# Patient Record
Sex: MALE | Race: WHITE | NOT HISPANIC OR LATINO | Employment: FULL TIME | URBAN - METROPOLITAN AREA
[De-identification: names, ages, dates, MRNs, and addresses within clinical notes are randomized per-mention and may not be internally consistent; named-entity substitution may affect disease eponyms.]

---
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
## 2021-03-30 ENCOUNTER — OFFICE VISIT (OUTPATIENT)
Dept: SURGERY | Facility: CLINIC | Age: 21
End: 2021-03-30
Attending: SURGERY
Payer: COMMERCIAL

## 2021-03-30 VITALS
HEART RATE: 76 BPM | SYSTOLIC BLOOD PRESSURE: 133 MMHG | WEIGHT: 131.39 LBS | DIASTOLIC BLOOD PRESSURE: 79 MMHG | BODY MASS INDEX: 17.8 KG/M2 | HEIGHT: 72 IN

## 2021-03-30 DIAGNOSIS — Q67.6 PECTUS EXCAVATUM: Primary | ICD-10-CM

## 2021-03-30 PROCEDURE — 99212 OFFICE O/P EST SF 10 MIN: CPT | Performed by: SURGERY

## 2021-03-30 PROCEDURE — G0463 HOSPITAL OUTPT CLINIC VISIT: HCPCS

## 2021-03-30 RX ORDER — CEFAZOLIN SODIUM 1 G/3ML
1 INJECTION, POWDER, FOR SOLUTION INTRAMUSCULAR; INTRAVENOUS SEE ADMIN INSTRUCTIONS
Status: CANCELLED | OUTPATIENT
Start: 2021-03-30

## 2021-03-30 ASSESSMENT — PAIN SCALES - GENERAL: PAINLEVEL: NO PAIN (0)

## 2021-03-30 ASSESSMENT — MIFFLIN-ST. JEOR: SCORE: 1636.62

## 2021-03-30 NOTE — NURSING NOTE
"[facility] [[id number]]  Chief Complaint   Patient presents with     RECHECK     f/u     Initial /79 (BP Location: Right arm, Patient Position: Sitting, Cuff Size: Adult Small)   Pulse 76   Ht 5' 11.54\" (181.7 cm)   Wt 131 lb 6.3 oz (59.6 kg)   BMI 18.05 kg/m   Estimated body mass index is 18.05 kg/m  as calculated from the following:    Height as of this encounter: 5' 11.54\" (181.7 cm).    Weight as of this encounter: 131 lb 6.3 oz (59.6 kg).  Medication Reconciliation: complete   Julee Posadas LPN      "

## 2021-03-30 NOTE — LETTER
3/30/2021      RE: David Schneider  [address]  [facility] [address]       2021         Leyla Cardona MD   [facility]   [facility] [facility], [address]   [place] [address]       RE: David Schneider   MRN: [id number]   : 2000      Dear Dr. Cardona:      It was a pleasure to see your patient, David, here at the Pediatric Surgery Clinic at the [facility].  As you recall, he is a young man well known to me that I have been following for years with pectus excavatum and 2-1/2 years ago I brought him to the operating room and performed an uneventful modified Ravitch repair of his pectus excavatum.  He now presents for consideration for pectus bar removal.  With the situation with COVID-19, he elected not to come in and be seen at that time, which I cannot blame.        On exam, his chest wall looks beautiful.  His incision has healed up very nicely and I could feel that the pectus bar.  I would like to proceed with removal of pectus bar.  I discussed the nuances of the surgical approach, including the risks, benefits and alternatives to the procedure with him and his mom.  They appeared to understand and agreed to proceed and we will proceed with scheduling in the near future.      I appreciate the opportunity to be able to participate in the care of your patient.  If there are any questions or concerns, please do not hesitate to contact me.      Sincerely,      Chandu Blood MD   Pediatric Surgery

## 2021-03-30 NOTE — PROGRESS NOTES
2021         Leyla Cardona MD   [facility]   [address] [place] [address], [address]   [place] [address]       RE: David Schneider   MRN: [id number]   : 2000      Dear Dr. Cardona:      It was a pleasure to see your patient, David, here at the Pediatric Surgery Clinic at the [facility].  As you recall, he is a young man well known to me that I have been following for years with pectus excavatum and 2-1/2 years ago I brought him to the operating room and performed an uneventful modified Ravitch repair of his pectus excavatum.  He now presents for consideration for pectus bar removal.  With the situation with COVID-19, he elected not to come in and be seen at that time, which I cannot blame.        On exam, his chest wall looks beautiful.  His incision has healed up very nicely and I could feel that the pectus bar.  I would like to proceed with removal of pectus bar.  I discussed the nuances of the surgical approach, including the risks, benefits and alternatives to the procedure with him and his mom.  They appeared to understand and agreed to proceed and we will proceed with scheduling in the near future.      I appreciate the opportunity to be able to participate in the care of your patient.  If there are any questions or concerns, please do not hesitate to contact me.      Sincerely,      Chandu Blood MD   Pediatric Surgery

## 2021-05-04 PROBLEM — Q67.6 PECTUS EXCAVATUM: Status: ACTIVE | Noted: 2018-12-05

## 2021-06-09 DIAGNOSIS — Z11.59 ENCOUNTER FOR SCREENING FOR OTHER VIRAL DISEASES: ICD-10-CM

## 2021-07-20 ENCOUNTER — LAB (OUTPATIENT)
Dept: LAB | Facility: CLINIC | Age: 21
End: 2021-07-20
Payer: COMMERCIAL

## 2021-07-20 DIAGNOSIS — Z11.59 ENCOUNTER FOR SCREENING FOR OTHER VIRAL DISEASES: ICD-10-CM

## 2021-07-20 LAB — SARS-COV-2 RNA RESP QL NAA+PROBE: NEGATIVE

## 2021-07-20 PROCEDURE — U0003 INFECTIOUS AGENT DETECTION BY NUCLEIC ACID (DNA OR RNA); SEVERE ACUTE RESPIRATORY SYNDROME CORONAVIRUS 2 (SARS-COV-2) (CORONAVIRUS DISEASE [COVID-19]), AMPLIFIED PROBE TECHNIQUE, MAKING USE OF HIGH THROUGHPUT TECHNOLOGIES AS DESCRIBED BY CMS-2020-01-R: HCPCS

## 2021-07-20 PROCEDURE — U0005 INFEC AGEN DETEC AMPLI PROBE: HCPCS

## 2021-07-22 ENCOUNTER — ANESTHESIA EVENT (OUTPATIENT)
Dept: SURGERY | Facility: CLINIC | Age: 21
End: 2021-07-22
Payer: COMMERCIAL

## 2021-07-23 ENCOUNTER — HOSPITAL ENCOUNTER (OUTPATIENT)
Facility: CLINIC | Age: 21
Discharge: HOME | End: 2021-07-23
Attending: SURGERY | Admitting: SURGERY
Payer: COMMERCIAL

## 2021-07-23 ENCOUNTER — APPOINTMENT (OUTPATIENT)
Dept: GENERAL RADIOLOGY | Facility: CLINIC | Age: 21
End: 2021-07-23
Attending: SURGERY
Payer: COMMERCIAL

## 2021-07-23 ENCOUNTER — ANESTHESIA (OUTPATIENT)
Dept: SURGERY | Facility: CLINIC | Age: 21
End: 2021-07-23
Payer: COMMERCIAL

## 2021-07-23 VITALS
WEIGHT: 139.77 LBS | TEMPERATURE: 97.5 F | HEIGHT: 72 IN | RESPIRATION RATE: 14 BRPM | OXYGEN SATURATION: 98 % | DIASTOLIC BLOOD PRESSURE: 79 MMHG | SYSTOLIC BLOOD PRESSURE: 125 MMHG | BODY MASS INDEX: 18.93 KG/M2 | HEART RATE: 64 BPM

## 2021-07-23 DIAGNOSIS — Q67.6 PECTUS EXCAVATUM: Primary | ICD-10-CM

## 2021-07-23 LAB — GLUCOSE BLDC GLUCOMTR-MCNC: 90 MG/DL (ref 70–99)

## 2021-07-23 PROCEDURE — 250N000011 HC RX IP 250 OP 636: Performed by: SURGERY

## 2021-07-23 PROCEDURE — 250N000024 HC ISOFLURANE, PER MIN: Performed by: SURGERY

## 2021-07-23 PROCEDURE — 250N000009 HC RX 250: Performed by: NURSE ANESTHETIST, CERTIFIED REGISTERED

## 2021-07-23 PROCEDURE — 710N000012 HC RECOVERY PHASE 2, PER MINUTE: Performed by: SURGERY

## 2021-07-23 PROCEDURE — 250N000011 HC RX IP 250 OP 636: Performed by: NURSE ANESTHETIST, CERTIFIED REGISTERED

## 2021-07-23 PROCEDURE — 258N000003 HC RX IP 258 OP 636: Performed by: NURSE ANESTHETIST, CERTIFIED REGISTERED

## 2021-07-23 PROCEDURE — 250N000009 HC RX 250: Performed by: SURGERY

## 2021-07-23 PROCEDURE — 258N000003 HC RX IP 258 OP 636: Performed by: SURGERY

## 2021-07-23 PROCEDURE — 20680 REMOVAL OF IMPLANT DEEP: CPT | Mod: GC | Performed by: SURGERY

## 2021-07-23 PROCEDURE — 710N000010 HC RECOVERY PHASE 1, LEVEL 2, PER MIN: Performed by: SURGERY

## 2021-07-23 PROCEDURE — 370N000017 HC ANESTHESIA TECHNICAL FEE, PER MIN: Performed by: SURGERY

## 2021-07-23 PROCEDURE — 272N000001 HC OR GENERAL SUPPLY STERILE: Performed by: SURGERY

## 2021-07-23 PROCEDURE — 999N000141 HC STATISTIC PRE-PROCEDURE NURSING ASSESSMENT: Performed by: SURGERY

## 2021-07-23 PROCEDURE — 360N000076 HC SURGERY LEVEL 3, PER MIN: Performed by: SURGERY

## 2021-07-23 PROCEDURE — 999N000180 XR SURGERY CARM FLUORO LESS THAN 5 MIN: Mod: TC

## 2021-07-23 RX ORDER — ONDANSETRON 2 MG/ML
INJECTION INTRAMUSCULAR; INTRAVENOUS PRN
Status: DISCONTINUED | OUTPATIENT
Start: 2021-07-23 | End: 2021-07-23

## 2021-07-23 RX ORDER — KETOROLAC TROMETHAMINE 30 MG/ML
INJECTION, SOLUTION INTRAMUSCULAR; INTRAVENOUS PRN
Status: DISCONTINUED | OUTPATIENT
Start: 2021-07-23 | End: 2021-07-23

## 2021-07-23 RX ORDER — CEFAZOLIN SODIUM 1 G/3ML
1 INJECTION, POWDER, FOR SOLUTION INTRAMUSCULAR; INTRAVENOUS SEE ADMIN INSTRUCTIONS
Status: DISCONTINUED | OUTPATIENT
Start: 2021-07-23 | End: 2021-07-23 | Stop reason: HOSPADM

## 2021-07-23 RX ORDER — FENTANYL CITRATE 50 UG/ML
INJECTION, SOLUTION INTRAMUSCULAR; INTRAVENOUS PRN
Status: DISCONTINUED | OUTPATIENT
Start: 2021-07-23 | End: 2021-07-23

## 2021-07-23 RX ORDER — ACETAMINOPHEN 325 MG/1
650 TABLET ORAL
Status: DISCONTINUED | OUTPATIENT
Start: 2021-07-23 | End: 2021-07-23 | Stop reason: HOSPADM

## 2021-07-23 RX ORDER — DEXAMETHASONE SODIUM PHOSPHATE 4 MG/ML
INJECTION, SOLUTION INTRA-ARTICULAR; INTRALESIONAL; INTRAMUSCULAR; INTRAVENOUS; SOFT TISSUE PRN
Status: DISCONTINUED | OUTPATIENT
Start: 2021-07-23 | End: 2021-07-23

## 2021-07-23 RX ORDER — FENTANYL CITRATE 50 UG/ML
25 INJECTION, SOLUTION INTRAMUSCULAR; INTRAVENOUS EVERY 10 MIN PRN
Status: DISCONTINUED | OUTPATIENT
Start: 2021-07-23 | End: 2021-07-23 | Stop reason: HOSPADM

## 2021-07-23 RX ORDER — OMEGA-3 FATTY ACIDS/FISH OIL 300-1000MG
400 CAPSULE ORAL EVERY 4 HOURS PRN
Qty: 252 CAPSULE | Refills: 0 | Status: SHIPPED | OUTPATIENT
Start: 2021-07-23 | End: 2021-08-06

## 2021-07-23 RX ORDER — LIDOCAINE HYDROCHLORIDE 20 MG/ML
INJECTION, SOLUTION INFILTRATION; PERINEURAL PRN
Status: DISCONTINUED | OUTPATIENT
Start: 2021-07-23 | End: 2021-07-23

## 2021-07-23 RX ORDER — SODIUM CHLORIDE, SODIUM LACTATE, POTASSIUM CHLORIDE, CALCIUM CHLORIDE 600; 310; 30; 20 MG/100ML; MG/100ML; MG/100ML; MG/100ML
INJECTION, SOLUTION INTRAVENOUS CONTINUOUS PRN
Status: DISCONTINUED | OUTPATIENT
Start: 2021-07-23 | End: 2021-07-23

## 2021-07-23 RX ORDER — ACETAMINOPHEN 325 MG/1
325-650 TABLET ORAL EVERY 6 HOURS PRN
Qty: 112 TABLET | Refills: 0 | Status: SHIPPED | OUTPATIENT
Start: 2021-07-23 | End: 2021-08-06

## 2021-07-23 RX ORDER — PROPOFOL 10 MG/ML
INJECTION, EMULSION INTRAVENOUS PRN
Status: DISCONTINUED | OUTPATIENT
Start: 2021-07-23 | End: 2021-07-23

## 2021-07-23 RX ORDER — HYDROMORPHONE HYDROCHLORIDE 1 MG/ML
0.3 INJECTION, SOLUTION INTRAMUSCULAR; INTRAVENOUS; SUBCUTANEOUS EVERY 10 MIN PRN
Status: DISCONTINUED | OUTPATIENT
Start: 2021-07-23 | End: 2021-07-23 | Stop reason: HOSPADM

## 2021-07-23 RX ORDER — BUPIVACAINE HYDROCHLORIDE 2.5 MG/ML
INJECTION, SOLUTION EPIDURAL; INFILTRATION; INTRACAUDAL PRN
Status: DISCONTINUED | OUTPATIENT
Start: 2021-07-23 | End: 2021-07-23 | Stop reason: HOSPADM

## 2021-07-23 RX ORDER — OXYCODONE HYDROCHLORIDE 5 MG/1
5 TABLET ORAL EVERY 6 HOURS PRN
Qty: 12 TABLET | Refills: 0 | Status: SHIPPED | OUTPATIENT
Start: 2021-07-23 | End: 2021-07-28

## 2021-07-23 RX ORDER — ALBUTEROL SULFATE 0.83 MG/ML
2.5 SOLUTION RESPIRATORY (INHALATION)
Status: DISCONTINUED | OUTPATIENT
Start: 2021-07-23 | End: 2021-07-23 | Stop reason: HOSPADM

## 2021-07-23 RX ORDER — MAGNESIUM HYDROXIDE 1200 MG/15ML
LIQUID ORAL PRN
Status: DISCONTINUED | OUTPATIENT
Start: 2021-07-23 | End: 2021-07-23 | Stop reason: HOSPADM

## 2021-07-23 RX ADMIN — FENTANYL CITRATE 50 MCG: 50 INJECTION, SOLUTION INTRAMUSCULAR; INTRAVENOUS at 15:12

## 2021-07-23 RX ADMIN — DEXMEDETOMIDINE 8 MCG: 100 INJECTION, SOLUTION, CONCENTRATE INTRAVENOUS at 15:12

## 2021-07-23 RX ADMIN — SODIUM CHLORIDE, POTASSIUM CHLORIDE, SODIUM LACTATE AND CALCIUM CHLORIDE: 600; 310; 30; 20 INJECTION, SOLUTION INTRAVENOUS at 14:36

## 2021-07-23 RX ADMIN — PROPOFOL 50 MG: 10 INJECTION, EMULSION INTRAVENOUS at 14:39

## 2021-07-23 RX ADMIN — FENTANYL CITRATE 100 MCG: 50 INJECTION, SOLUTION INTRAMUSCULAR; INTRAVENOUS at 14:36

## 2021-07-23 RX ADMIN — SUGAMMADEX 200 MG: 100 INJECTION, SOLUTION INTRAVENOUS at 15:17

## 2021-07-23 RX ADMIN — CEFAZOLIN 1500 MG: 1 INJECTION, POWDER, FOR SOLUTION INTRAMUSCULAR; INTRAVENOUS at 14:44

## 2021-07-23 RX ADMIN — KETOROLAC TROMETHAMINE 30 MG: 30 INJECTION, SOLUTION INTRAMUSCULAR at 15:20

## 2021-07-23 RX ADMIN — LIDOCAINE HYDROCHLORIDE 80 MG: 20 INJECTION, SOLUTION INFILTRATION; PERINEURAL at 14:38

## 2021-07-23 RX ADMIN — PROPOFOL 150 MG: 10 INJECTION, EMULSION INTRAVENOUS at 14:38

## 2021-07-23 RX ADMIN — ROCURONIUM BROMIDE 50 MG: 10 INJECTION INTRAVENOUS at 14:38

## 2021-07-23 RX ADMIN — MIDAZOLAM 2 MG: 1 INJECTION INTRAMUSCULAR; INTRAVENOUS at 14:36

## 2021-07-23 RX ADMIN — ONDANSETRON 4 MG: 2 INJECTION INTRAMUSCULAR; INTRAVENOUS at 15:13

## 2021-07-23 RX ADMIN — DEXMEDETOMIDINE 12 MCG: 100 INJECTION, SOLUTION, CONCENTRATE INTRAVENOUS at 14:48

## 2021-07-23 RX ADMIN — DEXAMETHASONE SODIUM PHOSPHATE 8 MG: 4 INJECTION, SOLUTION INTRAMUSCULAR; INTRAVENOUS at 14:44

## 2021-07-23 ASSESSMENT — MIFFLIN-ST. JEOR: SCORE: 1682

## 2021-07-23 NOTE — BRIEF OP NOTE
[facility]    Brief Operative Note    Pre-operative diagnosis: Pectus excavatum [Q67.6]  Post-operative diagnosis Same as pre-operative diagnosis    Procedure: Procedure(s):  REMOVAL, Ravitch BAR  Surgeon: Surgeon(s) and Role:     * Chandu Blood MD - Primary  Anesthesia: General   Estimated blood loss: 3cc  Drains: None  Findings:   Encased Ravitch bar requiring bone removal, after freeing the right side, able to remove with bone hook with ease. .  Complications: None.  Implants:   Implant Name Type Inv. Item Serial No.  Lot No. LRB No. Used Action   IMP STRUT FONKALSRUD STERNAL SUPPORT 18CM 9985-18 Metallic Hardware/[place] IMP STRUT FONKALSRUD STERNAL SUPPORT 18CM 9985-18  [employer]  N/A 1 Explanted

## 2021-07-23 NOTE — ANESTHESIA PROCEDURE NOTES
Airway       Patient location during procedure: OR       Procedure Start/Stop Times: 7/23/2021 2:40 PM  Staff -        CRNA: Funmilayo Márquez APRN CRNA       Performed By: CRNA  Consent for Airway        Urgency: elective  Indications and Patient Condition       Indications for airway management: robert-procedural       Induction type:intravenous       Mask difficulty assessment: 1 - vent by mask    Final Airway Details       Final airway type: endotracheal airway       Successful airway: ETT - single  Endotracheal Airway Details        ETT size (mm): 7.0       Cuffed: yes       Successful intubation technique: direct laryngoscopy       DL Blade Type: Rojas 2       Grade View of Cords: 1       Adjucts: stylet       Position: Right       Measured from: lips       Secured at (cm): 21       Bite block used: None    Post intubation assessment        Placement verified by: capnometry, equal breath sounds and chest rise        Number of attempts at approach: 1       Number of other approaches attempted: 0       Secured with: silk tape       Ease of procedure: easy       Dentition: Intact and Unchanged

## 2021-07-23 NOTE — DISCHARGE INSTRUCTIONS
Same-Day Surgery   Discharge Orders & Instructions For Your Child    For 24 hours after surgery:  1. Your child should get plenty of rest.  Avoid strenuous play.  Offer reading, coloring and other light activities.   2. Your child may go back to a regular diet.  Offer light meals at first.   3. If your child has nausea (feels sick to the stomach) or vomiting (throws up):  offer clear liquids such as apple juice, flat soda pop, Jell-O, Popsicles, Gatorade and clear soups.  Be sure your child drinks enough fluids.  Move to a normal diet as your child is able.   4. Your child may feel dizzy or sleepy.  He or she should avoid activities that required balance (riding a bike or skateboard, climbing stairs, skating).  5. A slight fever is normal.  Call the doctor if the fever is over 100 F (37.7 C) (taken under the tongue) or lasts longer than 24 hours.  6. Your child may have a dry mouth, flushed face, sore throat, muscle aches, or nightmares.  These should go away within 24 hours.  7. A responsible adult must stay with the child.  All caregivers should get a copy of these instructions.   Pain Management:      1. Take pain medication (if prescribed) for pain as directed by your physician.        2. WARNING: If the pain medication you have been prescribed contains Tylenol    (acetaminophen), DO NOT take additional doses of Tylenol (acetaminophen).    Call your doctor for any of the followin.   Signs of infection (fever, growing tenderness at the surgery site, severe pain, a large amount of drainage or bleeding, foul-smelling drainage, redness, swelling).    2.   It has been over 8 to 10 hours since surgery and your child is still not able to urinate (pee) or is complaining about not being able to urinate (pee).   To contact a doctor, call _Jeremi's office at [phone]_______ or:        [phone] and ask for the Resident On Call for      Pediatric surgeon on call (answered 24 hours a day)            Emergency  Department:  [facility]'s Emergency Department:  [phone]             Rev. 10/2014

## 2021-07-23 NOTE — ANESTHESIA CARE TRANSFER NOTE
Patient: David Schneider    Procedure(s):  REMOVAL, Ravitch BAR    Diagnosis: Pectus excavatum [Q67.6]  Diagnosis Additional Information: No value filed.    Anesthesia Type:   No value filed.     Note:    Oropharynx: oropharynx clear of all foreign objects and spontaneously breathing  Level of Consciousness: drowsy  Oxygen Supplementation: face mask  Level of Supplemental Oxygen (L/min / FiO2): 7  Independent Airway: airway patency satisfactory and stable  Dentition: dentition unchanged  Vital Signs Stable: post-procedure vital signs reviewed and stable  Report to RN Given: handoff report given  Patient transferred to: PACU    Handoff Report: Identifed the Patient, Identified the Reponsible Provider, Reviewed the pertinent medical history, Discussed the surgical course, Reviewed Intra-OP anesthesia mangement and issues during anesthesia, Set expectations for post-procedure period and Allowed opportunity for questions and acknowledgement of understanding      Vitals:  Vitals Value Taken Time   BP     Temp     Pulse     Resp     SpO2         Electronically Signed By: DURAN Lazar CRNA  July 23, 2021  3:45 PM

## 2021-07-23 NOTE — ANESTHESIA POSTPROCEDURE EVALUATION
Patient: David Schneider    Procedure(s):  REMOVAL, Ravitch BAR    Diagnosis:Pectus excavatum [Q67.6]  Diagnosis Additional Information: No value filed.    Anesthesia Type:  No value filed.    Note:  Disposition: Outpatient   Postop Pain Control: Uneventful            Sign Out: Well controlled pain   PONV: No   Neuro/Psych: Uneventful            Sign Out: Acceptable/Baseline neuro status   Airway/Respiratory: Uneventful            Sign Out: Acceptable/Baseline resp. status   CV/Hemodynamics: Uneventful            Sign Out: Acceptable CV status; No obvious hypovolemia; No obvious fluid overload   Other NRE: NONE   DID A NON-ROUTINE EVENT OCCUR? No    Event details/Postop Comments:  David has recovered well from anesthesia. VSS on RA. Native airway unchanged from baseline. Able to eat without issue.            Last vitals:  Vitals Value Taken Time   /61 07/23/21 1630   Temp 36.5  C (97.7  F) 07/23/21 1630   Pulse 58 07/23/21 1639   Resp 25 07/23/21 1639   SpO2 97 % 07/23/21 1639   Vitals shown include unvalidated device data.    Electronically Signed By: Carri Kearney MD  July 23, 2021  5:20 PM

## 2021-07-23 NOTE — OP NOTE
Procedure Date: 2021    PREOPERATIVE DIAGNOSIS:  Completed therapy for pectus excavatum.    POSTOPERATIVE DIAGNOSIS:  Completed therapy for pectus excavatum.    PROCEDURE:  Removal Ravitch pectus bar.    SURGEON:  Chandu Blood MD    RESIDENT SURGEON:  Cherise Sheffield MD    ANESTHESIA:  General.    PREOPERATIVE NOTE:  David is a 20-year-old male with a history of a modified Ravitch repair of his pectus excavatum now presents for pectus bar removal.  He and his mother were apprised of the risks, benefits, and alternatives of the procedure.  They appear to understand and agreed to proceed.    DESCRIPTION OF PROCEDURE:  With the patient in supine position under general anesthesia, he was prepped and draped in usual sterile fashion.  Using a C-arm, the pectus bar was located on the anterior chest wall and a small incision was created on the right zahra chest wall, carried down to the level of the bar, was dissected free as it had been encased in bone. Using a rongeur, the pectus bar at the very end was dissected free from the surrounding structures with this rongeur and then grasped with a bone hook and then removed from the operative field with a purposeful traction.  The area was then infiltrated with 0.25% Marcaine without epinephrine and closed in layers, deep layer reapproximated with 3-0 PDS in running fashion.  Skin closed with 4-0 Monocryl in subcuticular fashion.  Benzoin, Steri-Strips then applied.  All sponge and needle counts were correct at the termination of the operative procedure.  Estimated blood loss was less than 5 mL and the patient appeared to tolerate the procedure well.    Chandu Blood MD        D: 2021   T: 2021   MT: [id number]    Name:     DAVID SANDHU  MRN:      [id number]        Account:        [id number]   :      2000           Procedure Date: 2021     Document: [id number]

## 2021-08-14 ENCOUNTER — APPOINTMENT (OUTPATIENT)
Dept: RADIOLOGY | Facility: CLINIC | Age: 21
End: 2021-08-14
Attending: FAMILY MEDICINE

## 2021-08-14 ENCOUNTER — HOSPITAL ENCOUNTER (EMERGENCY)
Facility: CLINIC | Age: 21
Discharge: HOME | End: 2021-08-14
Attending: FAMILY MEDICINE | Admitting: FAMILY MEDICINE

## 2021-08-14 VITALS
WEIGHT: 139 LBS | DIASTOLIC BLOOD PRESSURE: 81 MMHG | TEMPERATURE: 97.6 F | RESPIRATION RATE: 16 BRPM | HEART RATE: 77 BPM | SYSTOLIC BLOOD PRESSURE: 155 MMHG | OXYGEN SATURATION: 99 % | BODY MASS INDEX: 18.85 KG/M2

## 2021-08-14 DIAGNOSIS — S61.231A PUNCTURE WOUND OF LEFT INDEX FINGER: ICD-10-CM

## 2021-08-14 PROCEDURE — 99283 EMERGENCY DEPT VISIT LOW MDM: CPT

## 2021-08-14 PROCEDURE — 250N000009 HC RX 250: Performed by: FAMILY MEDICINE

## 2021-08-14 PROCEDURE — 73140 X-RAY EXAM OF FINGER(S): CPT | Mod: LT

## 2021-08-14 RX ORDER — GINSENG 100 MG
CAPSULE ORAL ONCE
Status: COMPLETED | OUTPATIENT
Start: 2021-08-14 | End: 2021-08-14

## 2021-08-14 RX ADMIN — BACITRACIN: 500 OINTMENT TOPICAL at 15:25

## 2021-08-14 ASSESSMENT — ENCOUNTER SYMPTOMS
WOUND: 1
NUMBNESS: 1
JOINT SWELLING: 1

## 2021-08-14 NOTE — ED TRIAGE NOTES
Patient is here with a finger injury to his left hand 2nd digit. He was at work and got a screw stuck in there and pulled it out. He does have blood and pain noted.

## 2021-08-14 NOTE — ED PROVIDER NOTES
EMERGENCY DEPARTMENT ENCOUNTER      NAME: David Schneider  AGE: 20 year old male  YOB: 2000  MRN: [id number]  EVALUATION DATE & TIME: 8/14/2021  1:35 PM    PCP: Leyla Cardona    ED PROVIDER: Chavo Juan M.D.    CC: left finger injury    FINAL IMPRESSION:  1. Puncture wound of left index finger        ED COURSE & MEDICAL DECISION MAKING:    Pertinent Labs & Imaging studies personally reviewed and interpreted by me. (See chart for details)  2:10 PM Patient seen and examined, prior records reviewed. PPE worn throughout all patient encounters; N95 mask, gloves.  Patient presents after injuring his left index finger 2 days ago.  He isolated of a drill into it.  He has been washing with antiseptic wipes, dressing with antibiotic ointment and Band-Aid.  On exam, there is swelling, the wound itself does not have any drainage or surrounding erythema.  Full extension, flexion is limited due to swelling but resisted flexion at the DIP, PIP, and MCP intact.  Slight decrease sensation of the dorsum of the finger likely due to swelling.  X-rays ordered to evaluate for fracture, otherwise continue wound care and follow-up with hand surgery this week.  3:07 PM x-ray is negative.  Finger does not look acutely infected.  Follow-up with hand surgery this week otherwise continue current cares.       At the conclusion of the encounter I discussed the results of all of the tests and the disposition. The questions were answered. The patient or family acknowledged understanding and was agreeable with the care plan.     PROCEDURES:   Procedures    MEDICATIONS GIVEN IN THE EMERGENCY:  Medications - No data to display    NEW PRESCRIPTIONS STARTED AT TODAY'S ER VISIT  New Prescriptions    No medications on file       =================================================================    HPI    Patient information was obtained from: patient      David Schneider is a 20 year old male with a pertinent history of depression  who presents to this ED for evaluation of left 2nd finger injury. Patient was at work on Thursday (2 days ago) using a drill when the drill went through his left hand pointer finger around the medial joint while it was still running. He has been wiping the wound with antiseptic wipes, putting triple antibiotic ointment on it, and covering it with gauze and band aids. He came in today to ensure that it is healing alright. Patient reports that the tip of his finger has no pain but has some numbness. There is pain and swelling around the wound, and he is not able to close his fist entirely secondary to the swelling. He is able to move the joint without too much pain. It has been bleeding off and on since the injury. He is right handed and is unsure if he is up to date on his tetanus shot.        Per chart review: Last tetanus was 2018    REVIEW OF SYSTEMS   Review of Systems   Musculoskeletal: Positive for joint swelling (left hand pointer finger).        Positive for left pointer finger pain.   Skin: Positive for wound (left hand pointer finger, palmar aspect).   Neurological: Positive for numbness.   All other systems reviewed and are negative.     All other systems reviewed and negative    PAST MEDICAL HISTORY:  Past Medical History:   Diagnosis Date     Aortic root dilation (H)     mild     Pectus excavatum        PAST SURGICAL HISTORY:  Past Surgical History:   Procedure Laterality Date     ENT SURGERY      skin grafts for ear reconstruction     MYRINGOTOMY, INSERT TUBE BILATERAL, COMBINED       REMOVE HARDWARE PECTUS (BEN) N/A 7/23/2021    Procedure: REMOVAL, Ravitch BAR;  Surgeon: Chandu Blood MD;  Location: [facility] OR     REPAIR PECTUS EXCAVATUM N/A 12/5/2018    Procedure: REPAIR PECTUS EXCAVATUM  (RAVITCH) ;  Surgeon: Chandu Blood MD;  Location: [facility] OR       CURRENT MEDICATIONS:    No current facility-administered medications for this encounter.     Current Outpatient Medications   Medication      acetaminophen (TYLENOL) 325 MG tablet     ibuprofen (ADVIL/MOTRIN) 200 MG tablet       ALLERGIES:  No Known Allergies    FAMILY HISTORY:  History reviewed. No pertinent family history.    SOCIAL HISTORY:   Social History     Socioeconomic History     Marital status: Single     Spouse name: Not on file     Number of children: Not on file     Years of education: Not on file     Highest education level: Not on file   Occupational History     Not on file   Tobacco Use     Smoking status: Current Every Day Smoker     Packs/day: 0.50     Types: Cigarettes     Smokeless tobacco: Current User     Tobacco comment: vaping as well sometimes   Substance and Sexual Activity     Alcohol use: Yes     Drug use: Yes     Types: Marijuana     Comment: usually daily     Sexual activity: Not on file   Other Topics Concern     Not on file   Social History Narrative     Not on file     Social Determinants of Health     Financial Resource Strain:      Difficulty of Paying Living Expenses:    Food Insecurity:      Worried About Running Out of Food in the Last Year:      Ran Out of Food in the Last Year:    Transportation Needs:      Lack of Transportation (Medical):      Lack of Transportation (Non-Medical):    Physical Activity:      Days of Exercise per Week:      Minutes of Exercise per Session:    Stress:      Feeling of Stress :    Social Connections:      Frequency of Communication with Friends and Family:      Frequency of Social Gatherings with Friends and Family:      Attends Buddhist Services:      Active Member of Clubs or Organizations:      Attends Club or Organization Meetings:      Marital Status:    Intimate Partner Violence:      Fear of Current or Ex-Partner:      Emotionally Abused:      Physically Abused:      Sexually Abused:        VITALS:  BP (!) 155/81   Pulse 77   Temp 97.6  F (36.4  C) (Temporal)   Resp 16   Wt 63 kg (139 lb)   SpO2 99%   BMI 18.85 kg/m      PHYSICAL EXAM:  Physical Exam  Vitals and  nursing note reviewed.   Constitutional:       Appearance: Normal appearance.   HENT:      Head: Normocephalic and atraumatic.      Right Ear: External ear normal.      Left Ear: External ear normal.      Nose: Nose normal.   Eyes:      Extraocular Movements: Extraocular movements intact.      Conjunctiva/sclera: Conjunctivae normal.      Pupils: Pupils are equal, round, and reactive to light.   Pulmonary:      Effort: Pulmonary effort is normal.   Musculoskeletal:         General: Normal range of motion.      Cervical back: Normal range of motion.      Comments: Swelling of mid and distal phalynx. 4 mm x 3 mm wound with central granulation. Flexion limited by swelling. Full extension. No redness or joint effusion.   Neurological:      General: No focal deficit present.      Mental Status: He is alert and oriented to person, place, and time. Mental status is at baseline.   Psychiatric:         Mood and Affect: Mood normal.         Behavior: Behavior normal.         Thought Content: Thought content normal.          LAB:  All pertinent labs reviewed and interpreted.  Results for orders placed or performed during the hospital encounter of 08/14/21   Fingers XR, 2-3 views, left    Impression    IMPRESSION: Normal joint spaces and alignment. No fracture.         RADIOLOGY:  Reviewed all pertinent imaging. Please see official radiology report.  Fingers XR, 2-3 views, left   Final Result   IMPRESSION: Normal joint spaces and alignment. No fracture.               I, Dee Dennison, am serving as a scribe to document services personally performed by Dr. Juan based on my observation and the provider's statements to me. I, Chavo Juan MD attest that Dee Dennison is acting in a scribe capacity, has observed my performance of the services and has documented them in accordance with my direction.    Chavo Juan M.D.  Emergency Medicine  [facility]  EMERGENCY ROOM  [address] [place] [id number]  [phone]  Dept: [phone]     Chavo Juan MD  08/14/21 1506

## 2025-02-03 NOTE — ANESTHESIA PREPROCEDURE EVALUATION
"Anesthesia Pre-Procedure Evaluation    Patient: David Schneider   MRN:     [id number] Gender:   male   Age:    20 year old :      2000        Preoperative Diagnosis: Pectus excavatum [Q67.6]   Procedure(s):  REMOVAL, Ravitch BAR     LABS:  CBC:   Lab Results   Component Value Date    WBC 10.5 2018    WBC 5.3 2017    HGB 13.3 2018    HGB 14.4 2017    HCT 40.7 2018    HCT 42.7 2017     2018     2017     BMP:   Lab Results   Component Value Date     2017     05/15/2009    POTASSIUM 3.8 2017    POTASSIUM 3.9 05/15/2009    CHLORIDE 108 2017    CHLORIDE 104 05/15/2009    CO2 29 2017    CO2 24 05/15/2009    BUN 8 2017    BUN 14 05/15/2009    CR 0.75 2017    CR 0.54 (H) 05/15/2009    GLC 94 2017    GLC 61 05/15/2009     COAGS: No results found for: PTT, INR, FIBR  POC: No results found for: BGM, HCG, HCGS  OTHER:   Lab Results   Component Value Date    REESE 8.6 (L) 2017    ALBUMIN 4.0 2017    PROTTOTAL 6.6 (L) 2017    ALT 15 2017    AST 11 2017    ALKPHOS 193 2017    BILITOTAL 1.1 2017    TSH 2.04 2017    T4 1.32 2007    CRP <3.0 2007        Preop Vitals    BP Readings from Last 3 Encounters:   21 133/79   10/29/19 110/65   18 113/75    Pulse Readings from Last 3 Encounters:   21 76   10/29/19 64   18 103      Resp Readings from Last 3 Encounters:   18 22   18 18   17 24    SpO2 Readings from Last 3 Encounters:   18 100%   18 98%   17 100%      Temp Readings from Last 1 Encounters:   19 36.5  C (97.7  F)    Ht Readings from Last 1 Encounters:   21 1.817 m (5' 11.54\")      Wt Readings from Last 1 Encounters:   21 59.6 kg (131 lb 6.3 oz)    Estimated body mass index is 18.05 kg/m  as calculated from the following:    Height as of 3/30/21: 1.817 m (5' 11.54\").    Weight " (E4) spontaneous as of 3/30/21: 59.6 kg (131 lb 6.3 oz).     LDA:        Past Medical History:   Diagnosis Date     Aortic root dilation (H)     mild     Pectus excavatum       Past Surgical History:   Procedure Laterality Date     ENT SURGERY      skin grafts for ear reconstruction     MYRINGOTOMY, INSERT TUBE BILATERAL, COMBINED       REPAIR PECTUS EXCAVATUM N/A 12/5/2018    Procedure: REPAIR PECTUS EXCAVATUM  (RAVITCH) ;  Surgeon: Chandu Blood MD;  Location: [facility] OR      No Known Allergies     Anesthesia Evaluation    ROS/Med Hx   Comments: Had multiple anesthetics in the past without issues.    No family hx of problems with anesthesia or bleeding problems.    Cardiovascular Findings   Comments: H/o aortic root dilation, resolved    TTE 2018  The aortic root at the sinus of Valsalva, sinotubular ridge and proximal  ascending aorta are normal.  Normal cardiac anatomy. Normal intracardiac connections. There is normal  appearance and motion of the tricuspid, mitral, pulmonary and aortic valves.  Normal right and left ventricular size and function.    Neuro Findings   Comments: ADHD, h/o depression w/ SI, disruptive mood disorder    Pulmonary Findings   (-) recent URI  Comments: Pectus excavatum s/p Ravitch bar  Current active smoker    HENT Findings   Comments: H/o perforated nasal septum        GI/Hepatic/Renal Findings - negative ROS      Genetic/Syndrome Findings   (+) genetic syndrome (MTHFR gene variant)    Hematology/Oncology Findings - negative hematology/oncology ROS            PHYSICAL EXAM:   Mental Status/Neuro: A/A/O   Airway: Facies: Feasible  Mallampati: I  Mouth/Opening: Full  TM distance: > 6 cm  Neck ROM: Full   Respiratory: Auscultation: CTAB     Resp. Rate: Normal     Resp. Effort: Normal      CV: Rhythm: Regular  Rate: Age appropriate  Heart: Normal Sounds  Edema: None   Comments:      Dental: Normal Dentition; Details    B=Bridge, C=Chipped, L=Loose, M=Missing                Anesthesia Plan    ASA  Status:  2   NPO Status:  NPO Appropriate    Anesthesia Type: General.     - Airway: ETT   Induction: Intravenous.   Maintenance: Balanced.        Consents    Anesthesia Plan(s) and associated risks, benefits, and realistic alternatives discussed. Questions answered and patient/representative(s) expressed understanding.     - Discussed with:  Patient      - Extended Intubation/Ventilatory Support Discussed: No.    Use of blood products discussed: Yes.     Postoperative Care    Pain management: IV analgesics, Oral pain medications.   PONV prophylaxis: Ondansetron (or other 5HT-3), Dexamethasone or Solumedrol     Comments:    Plan for GA w/ ETT  *AVOID nitrous oxide d/t known MTHFR mutation*    Discussed common and potentially harmful risks for General Anesthesia.   These risks include, but were not limited to: Sore throat, Airway injury, Dental injury, Aspiration, Respiratory issues (Bronchospasm, Laryngospasm, Desaturation), Hemodynamic issues (Arrhythmia, Hypotension, Ischemia), Potential long term consequences of respiratory and hemodynamic issues, PONV, Emergence delirium/agitation  Risks of invasive procedures were not discussed: N/A    All questions were answered.         Carri Kearney MD

## (undated) DEVICE — SOL WATER IRRIG 1000ML BOTTLE 2F7114

## (undated) DEVICE — Device

## (undated) DEVICE — DRAPE C-ARM W/STRAPS 42X72" 07-CA104

## (undated) DEVICE — PREP TECHNI-CARE CHLOROXYLENOL 3% 4OZ BOTTLE C222-4ZWO

## (undated) DEVICE — DRSG STERI STRIP 1/2X4" R1547

## (undated) DEVICE — SU PDS II 0 CT-1 27" Z340H

## (undated) DEVICE — DRAIN SURGIDYNE 10FR ROUND 332186

## (undated) DEVICE — SUCTION MANIFOLD DORNOCH ULTRA CART UL-CL500

## (undated) DEVICE — SYR BULB IRRIG 50ML LATEX FREE 0035280

## (undated) DEVICE — DRSG TEGADERM 2 3/8X2 3/4" 1624W

## (undated) DEVICE — TAPE MEDIPORE 2"X2YD 2862S

## (undated) DEVICE — ESU ELEC BLADE 2.75" COATED/INSULATED E1455

## (undated) DEVICE — PAD CHUX UNDERPAD 30X36" P3036C

## (undated) DEVICE — GLOVE PROTEXIS MICRO 7.0  2D73PM70

## (undated) DEVICE — LIGHT HANDLE X2

## (undated) DEVICE — TUBING SUCTION MEDI-VAC 1/4"X20' N620A

## (undated) DEVICE — LINEN TOWEL PACK X30 5481

## (undated) DEVICE — GLOVE PROTEXIS BLUE W/NEU-THERA 8.0  2D73EB80

## (undated) DEVICE — SU PDS II 3-0 SH 27" Z316H

## (undated) DEVICE — SPONGE LAP 18X18" X8435

## (undated) DEVICE — DRAPE TIBURON TOP SHEET 100X60" 29352

## (undated) DEVICE — DRSG TEGADERM 4X4 3/4" 1626W

## (undated) DEVICE — SUCTION MANIFOLD NEPTUNE 2 SYS 4 PORT 0702-020-000

## (undated) DEVICE — GLOVE PROTEXIS BLUE W/NEU-THERA 7.5  2D73EB75

## (undated) DEVICE — SU MONOCRYL 4-0 PS-2 18" UND Y496G

## (undated) DEVICE — STRAP KNEE/BODY 31143004

## (undated) DEVICE — DRAIN CHEST TUBE 36FR STR 8036

## (undated) DEVICE — SU VICRYL 3-0 SH 27" J316H

## (undated) DEVICE — DRAIN SABER RESERVIOR 100ML 310101

## (undated) DEVICE — SPONGE RAY-TEC 4X8" 7318

## (undated) DEVICE — GLOVE PROTEXIS MICRO 7.5  2D73PM75

## (undated) DEVICE — POSITIONER ARMBOARD FOAM 1PAIR LF FP-ARMB1

## (undated) DEVICE — ESU GROUND PAD UNIVERSAL W/O CORD

## (undated) DEVICE — SOL NACL 0.9% IRRIG 1000ML BOTTLE 2F7124

## (undated) DEVICE — SU PDS II 1 CTX 36" Z371T

## (undated) DEVICE — BAG TRANSPORT RED LINE RECLOSABLE 15X12" MGRL2P1215

## (undated) DEVICE — SUCTION TIP YANKAUER STR K87

## (undated) DEVICE — DRAPE U SPLIT 74X120" 29440

## (undated) DEVICE — ESU PENCIL W/SMOKE EVAC NEPTUNE STRYKER 0703-046-000

## (undated) DEVICE — SU ETHILON 3-0 PS-1 18" 1663H

## (undated) RX ORDER — BUPIVACAINE HYDROCHLORIDE 2.5 MG/ML
INJECTION, SOLUTION INFILTRATION; PERINEURAL
Status: DISPENSED
Start: 2021-07-23

## (undated) RX ORDER — FENTANYL CITRATE 50 UG/ML
INJECTION, SOLUTION INTRAMUSCULAR; INTRAVENOUS
Status: DISPENSED
Start: 2021-07-23

## (undated) RX ORDER — PHENYLEPHRINE HCL IN 0.9% NACL 1 MG/10 ML
SYRINGE (ML) INTRAVENOUS
Status: DISPENSED
Start: 2018-12-05

## (undated) RX ORDER — PROPOFOL 10 MG/ML
INJECTION, EMULSION INTRAVENOUS
Status: DISPENSED
Start: 2018-12-05

## (undated) RX ORDER — GABAPENTIN 300 MG/1
CAPSULE ORAL
Status: DISPENSED
Start: 2018-12-05

## (undated) RX ORDER — KETOROLAC TROMETHAMINE 30 MG/ML
INJECTION, SOLUTION INTRAMUSCULAR; INTRAVENOUS
Status: DISPENSED
Start: 2021-07-23

## (undated) RX ORDER — DEXAMETHASONE SODIUM PHOSPHATE 4 MG/ML
INJECTION, SOLUTION INTRA-ARTICULAR; INTRALESIONAL; INTRAMUSCULAR; INTRAVENOUS; SOFT TISSUE
Status: DISPENSED
Start: 2021-07-23

## (undated) RX ORDER — FENTANYL CITRATE 50 UG/ML
INJECTION, SOLUTION INTRAMUSCULAR; INTRAVENOUS
Status: DISPENSED
Start: 2018-12-05

## (undated) RX ORDER — ACETAMINOPHEN 325 MG/1
TABLET ORAL
Status: DISPENSED
Start: 2018-12-05

## (undated) RX ORDER — CEFAZOLIN SODIUM 2 G/100ML
INJECTION, SOLUTION INTRAVENOUS
Status: DISPENSED
Start: 2018-12-05

## (undated) RX ORDER — MEPERIDINE HYDROCHLORIDE 25 MG/ML
INJECTION INTRAMUSCULAR; INTRAVENOUS; SUBCUTANEOUS
Status: DISPENSED
Start: 2018-12-05

## (undated) RX ORDER — ONDANSETRON 2 MG/ML
INJECTION INTRAMUSCULAR; INTRAVENOUS
Status: DISPENSED
Start: 2021-07-23

## (undated) RX ORDER — KETOROLAC TROMETHAMINE 30 MG/ML
INJECTION, SOLUTION INTRAMUSCULAR; INTRAVENOUS
Status: DISPENSED
Start: 2018-12-05